# Patient Record
Sex: FEMALE | Race: WHITE | NOT HISPANIC OR LATINO | URBAN - METROPOLITAN AREA
[De-identification: names, ages, dates, MRNs, and addresses within clinical notes are randomized per-mention and may not be internally consistent; named-entity substitution may affect disease eponyms.]

---

## 2017-09-19 ENCOUNTER — EMERGENCY (EMERGENCY)
Facility: HOSPITAL | Age: 42
LOS: 1 days | Discharge: HOME | End: 2017-09-19
Attending: EMERGENCY MEDICINE | Admitting: EMERGENCY MEDICINE
Payer: COMMERCIAL

## 2017-09-19 VITALS
DIASTOLIC BLOOD PRESSURE: 77 MMHG | WEIGHT: 188.05 LBS | RESPIRATION RATE: 16 BRPM | HEART RATE: 83 BPM | SYSTOLIC BLOOD PRESSURE: 118 MMHG | OXYGEN SATURATION: 100 % | TEMPERATURE: 99 F

## 2017-09-19 VITALS
HEART RATE: 84 BPM | SYSTOLIC BLOOD PRESSURE: 124 MMHG | OXYGEN SATURATION: 98 % | RESPIRATION RATE: 15 BRPM | DIASTOLIC BLOOD PRESSURE: 74 MMHG | TEMPERATURE: 98 F

## 2017-09-19 PROCEDURE — 99283 EMERGENCY DEPT VISIT LOW MDM: CPT | Mod: 25

## 2017-09-19 PROCEDURE — 72040 X-RAY EXAM NECK SPINE 2-3 VW: CPT | Mod: 26

## 2017-09-19 PROCEDURE — 99284 EMERGENCY DEPT VISIT MOD MDM: CPT | Mod: 25

## 2017-09-19 PROCEDURE — 99053 MED SERV 10PM-8AM 24 HR FAC: CPT

## 2017-09-19 PROCEDURE — 72040 X-RAY EXAM NECK SPINE 2-3 VW: CPT

## 2017-09-19 NOTE — ED PROVIDER NOTE - OBJECTIVE STATEMENT
43yo female bib ems with neck pain. pt was at work and a 10lb parcel fell onto her head, felt her neck snap back and hyperexend, no tingling or weakness, pt denies LOC, no dizziness no other comalints
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
## 2017-09-23 DIAGNOSIS — S13.4XXA SPRAIN OF LIGAMENTS OF CERVICAL SPINE, INITIAL ENCOUNTER: ICD-10-CM
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
## 2017-09-23 DIAGNOSIS — M54.2 CERVICALGIA: ICD-10-CM

## 2019-07-16 ENCOUNTER — TRANSCRIPTION ENCOUNTER (OUTPATIENT)
Age: 44
End: 2019-07-16

## 2019-07-16 ENCOUNTER — APPOINTMENT (OUTPATIENT)
Dept: FAMILY MEDICINE | Facility: CLINIC | Age: 44
End: 2019-07-16
Payer: COMMERCIAL

## 2019-07-16 VITALS
TEMPERATURE: 98.7 F | RESPIRATION RATE: 12 BRPM | HEIGHT: 70.5 IN | BODY MASS INDEX: 25.48 KG/M2 | SYSTOLIC BLOOD PRESSURE: 110 MMHG | DIASTOLIC BLOOD PRESSURE: 70 MMHG | WEIGHT: 180 LBS | OXYGEN SATURATION: 99 % | HEART RATE: 88 BPM

## 2019-07-16 DIAGNOSIS — R05 COUGH: ICD-10-CM

## 2019-07-16 DIAGNOSIS — Z87.09 PERSONAL HISTORY OF OTHER DISEASES OF THE RESPIRATORY SYSTEM: ICD-10-CM

## 2019-07-16 PROCEDURE — 99213 OFFICE O/P EST LOW 20 MIN: CPT

## 2019-07-16 NOTE — HISTORY OF PRESENT ILLNESS
[FreeTextEntry1] : she is here for general check up, and she is complaining about cough with yellowish mucus. no fever, no vomiting.  [de-identified] : Complaints of a cough that started on July 5, 2019, no wheezing no shortness of breath

## 2019-08-29 ENCOUNTER — APPOINTMENT (OUTPATIENT)
Dept: SURGERY | Facility: CLINIC | Age: 44
End: 2019-08-29
Payer: COMMERCIAL

## 2019-08-29 ENCOUNTER — APPOINTMENT (OUTPATIENT)
Dept: FAMILY MEDICINE | Facility: CLINIC | Age: 44
End: 2019-08-29

## 2019-08-29 VITALS
HEIGHT: 70.5 IN | HEART RATE: 86 BPM | SYSTOLIC BLOOD PRESSURE: 107 MMHG | WEIGHT: 188 LBS | BODY MASS INDEX: 26.62 KG/M2 | DIASTOLIC BLOOD PRESSURE: 75 MMHG

## 2019-08-29 DIAGNOSIS — Z78.9 OTHER SPECIFIED HEALTH STATUS: ICD-10-CM

## 2019-08-29 DIAGNOSIS — Z80.42 FAMILY HISTORY OF MALIGNANT NEOPLASM OF PROSTATE: ICD-10-CM

## 2019-08-29 DIAGNOSIS — Z87.19 PERSONAL HISTORY OF OTHER DISEASES OF THE DIGESTIVE SYSTEM: ICD-10-CM

## 2019-08-29 PROCEDURE — 99243 OFF/OP CNSLTJ NEW/EST LOW 30: CPT

## 2019-08-30 ENCOUNTER — APPOINTMENT (OUTPATIENT)
Dept: FAMILY MEDICINE | Facility: CLINIC | Age: 44
End: 2019-08-30

## 2019-08-31 NOTE — HISTORY OF PRESENT ILLNESS
[de-identified] : Pt c/o left parotid mass for 1 year.  denies fever,  pain or infection, dysphagia, hoarseness, skin cancer excision, sour taste or RT exposure. \par MRI:  Left 1.7 cm Parotid mass

## 2019-08-31 NOTE — ASSESSMENT
[FreeTextEntry1] : suspect GERD as cause of arytenoid erythema. instructed to avoid caffeine and other irritants and to be evaluated by ENT. sonogram guided fine needle aspiration cytology parotid mass requested. to call next week for results.

## 2019-08-31 NOTE — PHYSICAL EXAM
[de-identified] : no palpable thyroid nodules [Laryngoscopy Performed] : laryngoscopy was performed, see procedure section for findings [Midline] : located in midline position [Normal] : orientation to person, place, and time: normal [de-identified] : mass difficult to palpate [de-identified] : indirect  laryngoscopy shows normal vocal cord mobility bilaterally with no lesions noted, arytenoid erythema noted

## 2019-08-31 NOTE — CONSULT LETTER
[Dear  ___] : Dear  [unfilled], [Consult Letter:] : I had the pleasure of evaluating your patient, [unfilled]. [Please see my note below.] : Please see my note below. [Consult Closing:] : Thank you very much for allowing me to participate in the care of this patient.  If you have any questions, please do not hesitate to contact me. [Sincerely,] : Sincerely, [FreeTextEntry2] : Dr. Andie Alvarez, Dr. Abebe Mejia [FreeTextEntry3] : Kalyan Lynn MD, FACS\par System Director, Endocrine Surgery\par [facility]\par  [DrReena  ___] : Dr. OAKES

## 2019-09-09 ENCOUNTER — FORM ENCOUNTER (OUTPATIENT)
Age: 44
End: 2019-09-09

## 2019-09-09 ENCOUNTER — RESULT REVIEW (OUTPATIENT)
Age: 44
End: 2019-09-09

## 2019-09-10 ENCOUNTER — OUTPATIENT (OUTPATIENT)
Dept: OUTPATIENT SERVICES | Facility: HOSPITAL | Age: 44
LOS: 1 days | End: 2019-09-10
Payer: COMMERCIAL

## 2019-09-10 ENCOUNTER — OTHER (OUTPATIENT)
Age: 44
End: 2019-09-10

## 2019-09-10 ENCOUNTER — APPOINTMENT (OUTPATIENT)
Dept: ULTRASOUND IMAGING | Facility: IMAGING CENTER | Age: 44
End: 2019-09-10
Payer: COMMERCIAL

## 2019-09-10 DIAGNOSIS — K11.8 OTHER DISEASES OF SALIVARY GLANDS: ICD-10-CM

## 2019-09-10 PROCEDURE — 88305 TISSUE EXAM BY PATHOLOGIST: CPT | Mod: 26

## 2019-09-10 PROCEDURE — 88173 CYTOPATH EVAL FNA REPORT: CPT

## 2019-09-10 PROCEDURE — 88172 CYTP DX EVAL FNA 1ST EA SITE: CPT

## 2019-09-10 PROCEDURE — 10005 FNA BX W/US GDN 1ST LES: CPT

## 2019-09-10 PROCEDURE — 88305 TISSUE EXAM BY PATHOLOGIST: CPT

## 2019-09-10 PROCEDURE — 88173 CYTOPATH EVAL FNA REPORT: CPT | Mod: 26

## 2019-09-12 ENCOUNTER — TRANSCRIPTION ENCOUNTER (OUTPATIENT)
Age: 44
End: 2019-09-12

## 2019-09-12 ENCOUNTER — OTHER (OUTPATIENT)
Age: 44
End: 2019-09-12

## 2019-11-19 ENCOUNTER — OUTPATIENT (OUTPATIENT)
Dept: OUTPATIENT SERVICES | Facility: HOSPITAL | Age: 44
LOS: 1 days | End: 2019-11-19

## 2019-11-19 VITALS
DIASTOLIC BLOOD PRESSURE: 74 MMHG | WEIGHT: 194.01 LBS | HEART RATE: 77 BPM | OXYGEN SATURATION: 98 % | RESPIRATION RATE: 16 BRPM | TEMPERATURE: 99 F | SYSTOLIC BLOOD PRESSURE: 112 MMHG | HEIGHT: 69 IN

## 2019-11-19 DIAGNOSIS — Z98.890 OTHER SPECIFIED POSTPROCEDURAL STATES: Chronic | ICD-10-CM

## 2019-11-19 DIAGNOSIS — K11.9 DISEASE OF SALIVARY GLAND, UNSPECIFIED: ICD-10-CM

## 2019-11-19 LAB
ANION GAP SERPL CALC-SCNC: 13 MMO/L — SIGNIFICANT CHANGE UP (ref 7–14)
BUN SERPL-MCNC: 10 MG/DL — SIGNIFICANT CHANGE UP (ref 7–23)
CALCIUM SERPL-MCNC: 9.1 MG/DL — SIGNIFICANT CHANGE UP (ref 8.4–10.5)
CHLORIDE SERPL-SCNC: 102 MMOL/L — SIGNIFICANT CHANGE UP (ref 98–107)
CO2 SERPL-SCNC: 22 MMOL/L — SIGNIFICANT CHANGE UP (ref 22–31)
CREAT SERPL-MCNC: 0.78 MG/DL — SIGNIFICANT CHANGE UP (ref 0.5–1.3)
GLUCOSE SERPL-MCNC: 98 MG/DL — SIGNIFICANT CHANGE UP (ref 70–99)
HCG SERPL-ACNC: < 5 MIU/ML — SIGNIFICANT CHANGE UP
HCT VFR BLD CALC: 43.2 % — SIGNIFICANT CHANGE UP (ref 34.5–45)
HGB BLD-MCNC: 13.8 G/DL — SIGNIFICANT CHANGE UP (ref 11.5–15.5)
MCHC RBC-ENTMCNC: 28.9 PG — SIGNIFICANT CHANGE UP (ref 27–34)
MCHC RBC-ENTMCNC: 31.9 % — LOW (ref 32–36)
MCV RBC AUTO: 90.4 FL — SIGNIFICANT CHANGE UP (ref 80–100)
NRBC # FLD: 0 K/UL — SIGNIFICANT CHANGE UP (ref 0–0)
PLATELET # BLD AUTO: 314 K/UL — SIGNIFICANT CHANGE UP (ref 150–400)
PMV BLD: 9.3 FL — SIGNIFICANT CHANGE UP (ref 7–13)
POTASSIUM SERPL-MCNC: 3.9 MMOL/L — SIGNIFICANT CHANGE UP (ref 3.5–5.3)
POTASSIUM SERPL-SCNC: 3.9 MMOL/L — SIGNIFICANT CHANGE UP (ref 3.5–5.3)
RBC # BLD: 4.78 M/UL — SIGNIFICANT CHANGE UP (ref 3.8–5.2)
RBC # FLD: 12.8 % — SIGNIFICANT CHANGE UP (ref 10.3–14.5)
SODIUM SERPL-SCNC: 137 MMOL/L — SIGNIFICANT CHANGE UP (ref 135–145)
WBC # BLD: 5.66 K/UL — SIGNIFICANT CHANGE UP (ref 3.8–10.5)
WBC # FLD AUTO: 5.66 K/UL — SIGNIFICANT CHANGE UP (ref 3.8–10.5)

## 2019-11-19 RX ORDER — SODIUM CHLORIDE 9 MG/ML
1000 INJECTION, SOLUTION INTRAVENOUS
Refills: 0 | Status: DISCONTINUED | OUTPATIENT
Start: 2019-12-02 | End: 2019-12-03

## 2019-11-19 RX ORDER — SODIUM CHLORIDE 9 MG/ML
3 INJECTION INTRAMUSCULAR; INTRAVENOUS; SUBCUTANEOUS ONCE
Refills: 0 | Status: DISCONTINUED | OUTPATIENT
Start: 2019-12-02 | End: 2019-12-03

## 2019-11-19 NOTE — H&P PST ADULT - ASSESSMENT
normal...
Problem: disease of salivary gland   Assessment and Plan: Patient scheduled for left parotidectomy with facial nerve dissection on 12/02/2019.    Patient provided with verbal and written presurgical instructions; verbalized understanding  with teach back.    Patient provided with famotidine for GI prophylaxis; verbalized understanding.    Patient provided with Chlorhexidine wash, verbal and written instructions reviewed. Patient demonstrated understanding with teach back.     Urine specimen cup provided

## 2019-11-19 NOTE — H&P PST ADULT - NSICDXPASTSURGICALHX_GEN_ALL_CORE_FT
PAST SURGICAL HISTORY:  History of colon surgery J-pouch 1996, pull through 1996    Status post laser ablation of incompetent vein

## 2019-11-19 NOTE — H&P PST ADULT - RS GEN PE MLT RESP DETAILS PC
breath sounds equal/good air movement/respirations non-labored/no rhonchi/no wheezes/clear to auscultation bilaterally/no rales/airway patent

## 2019-11-19 NOTE — H&P PST ADULT - NEGATIVE ENMT SYMPTOMS
no tinnitus/no hearing difficulty/no throat pain/no vertigo/no sinus symptoms/no nasal congestion/no nasal obstruction/no post-nasal discharge/no dry mouth/no nasal discharge/no ear pain
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
## 2019-11-27 PROBLEM — K51.90 ULCERATIVE COLITIS, UNSPECIFIED, WITHOUT COMPLICATIONS: Chronic | Status: ACTIVE | Noted: 2019-11-19

## 2019-11-27 PROBLEM — K52.9 NONINFECTIVE GASTROENTERITIS AND COLITIS, UNSPECIFIED: Chronic | Status: ACTIVE | Noted: 2019-11-19

## 2019-11-27 PROBLEM — K11.9 DISEASE OF SALIVARY GLAND, UNSPECIFIED: Chronic | Status: ACTIVE | Noted: 2019-11-19

## 2019-12-01 ENCOUNTER — TRANSCRIPTION ENCOUNTER (OUTPATIENT)
Age: 44
End: 2019-12-01

## 2019-12-02 ENCOUNTER — RESULT REVIEW (OUTPATIENT)
Age: 44
End: 2019-12-02

## 2019-12-02 ENCOUNTER — APPOINTMENT (OUTPATIENT)
Dept: SURGERY | Facility: HOSPITAL | Age: 44
End: 2019-12-02

## 2019-12-02 ENCOUNTER — OUTPATIENT (OUTPATIENT)
Dept: OUTPATIENT SERVICES | Facility: HOSPITAL | Age: 44
LOS: 1 days | Discharge: HOME | End: 2019-12-02
Payer: COMMERCIAL

## 2019-12-02 ENCOUNTER — OTHER (OUTPATIENT)
Age: 44
End: 2019-12-02

## 2019-12-02 VITALS
DIASTOLIC BLOOD PRESSURE: 61 MMHG | OXYGEN SATURATION: 98 % | SYSTOLIC BLOOD PRESSURE: 104 MMHG | HEART RATE: 74 BPM | RESPIRATION RATE: 15 BRPM

## 2019-12-02 VITALS
TEMPERATURE: 98 F | HEART RATE: 82 BPM | OXYGEN SATURATION: 96 % | SYSTOLIC BLOOD PRESSURE: 121 MMHG | DIASTOLIC BLOOD PRESSURE: 77 MMHG | WEIGHT: 194.01 LBS | RESPIRATION RATE: 18 BRPM | HEIGHT: 69 IN

## 2019-12-02 DIAGNOSIS — K11.9 DISEASE OF SALIVARY GLAND, UNSPECIFIED: ICD-10-CM

## 2019-12-02 DIAGNOSIS — Z98.890 OTHER SPECIFIED POSTPROCEDURAL STATES: Chronic | ICD-10-CM

## 2019-12-02 LAB — HCG UR QL: NEGATIVE — SIGNIFICANT CHANGE UP

## 2019-12-02 PROCEDURE — 42420 EXCISE PAROTID GLAND/LESION: CPT

## 2019-12-02 PROCEDURE — 88307 TISSUE EXAM BY PATHOLOGIST: CPT | Mod: 26

## 2019-12-02 PROCEDURE — 64716 REVISION OF CRANIAL NERVE: CPT | Mod: AS,59

## 2019-12-02 PROCEDURE — 13132 CMPLX RPR F/C/C/M/N/AX/G/H/F: CPT | Mod: 59

## 2019-12-02 PROCEDURE — 64716 REVISION OF CRANIAL NERVE: CPT | Mod: 59

## 2019-12-02 PROCEDURE — 42420 EXCISE PAROTID GLAND/LESION: CPT | Mod: AS

## 2019-12-02 RX ORDER — OXYCODONE HYDROCHLORIDE 5 MG/1
5 TABLET ORAL ONCE
Refills: 0 | Status: DISCONTINUED | OUTPATIENT
Start: 2019-12-02 | End: 2019-12-03

## 2019-12-02 RX ORDER — BENZOCAINE AND MENTHOL 5; 1 G/100ML; G/100ML
1 LIQUID ORAL
Refills: 0 | Status: DISCONTINUED | OUTPATIENT
Start: 2019-12-02 | End: 2019-12-03

## 2019-12-02 RX ORDER — NORTRIPTYLINE HYDROCHLORIDE 10 MG/1
4 CAPSULE ORAL
Qty: 0 | Refills: 0 | DISCHARGE

## 2019-12-02 RX ORDER — ACETAMINOPHEN 500 MG
650 TABLET ORAL EVERY 6 HOURS
Refills: 0 | Status: DISCONTINUED | OUTPATIENT
Start: 2019-12-02 | End: 2019-12-03

## 2019-12-02 RX ORDER — OXYCODONE AND ACETAMINOPHEN 5; 325 MG/1; MG/1
1 TABLET ORAL EVERY 4 HOURS
Refills: 0 | Status: DISCONTINUED | OUTPATIENT
Start: 2019-12-02 | End: 2019-12-03

## 2019-12-02 RX ORDER — ONDANSETRON 8 MG/1
4 TABLET, FILM COATED ORAL ONCE
Refills: 0 | Status: DISCONTINUED | OUTPATIENT
Start: 2019-12-02 | End: 2019-12-03

## 2019-12-02 RX ORDER — ACETAMINOPHEN 500 MG
2 TABLET ORAL
Qty: 0 | Refills: 0 | DISCHARGE
Start: 2019-12-02

## 2019-12-02 RX ORDER — FENTANYL CITRATE 50 UG/ML
50 INJECTION INTRAVENOUS
Refills: 0 | Status: DISCONTINUED | OUTPATIENT
Start: 2019-12-02 | End: 2019-12-03

## 2019-12-02 RX ADMIN — FENTANYL CITRATE 50 MICROGRAM(S): 50 INJECTION INTRAVENOUS at 10:50

## 2019-12-02 NOTE — ASU PATIENT PROFILE, ADULT - MUTUALITY COMMENT, PROFILE
pt will speak  to surgen and anesthesiologist pre op pt will speak  to surgeon and anesthesiologist pre op

## 2019-12-02 NOTE — BRIEF OPERATIVE NOTE - COMMENTS
no qualified surgical resident available. I assisted Dr Lynn in dissection of parotid mass, hemostasis and closure of surgical wound

## 2019-12-02 NOTE — ASU DISCHARGE PLAN (ADULT/PEDIATRIC) - CARE PROVIDER_API CALL
Kalyan Lynn)  Plastic Surgery; Surgery  [address], [address]  [place] [address]  Phone: [phone]  Fax: [phone]  Follow Up Time:

## 2019-12-02 NOTE — ASU PATIENT PROFILE, ADULT - PSH
History of colon surgery  J-pouch 1996, pull through 1996  Status post laser ablation of incompetent vein

## 2019-12-02 NOTE — ASU DISCHARGE PLAN (ADULT/PEDIATRIC) - NURSING INSTRUCTIONS
You were given intravenous TYLENOL for pain management at ___9:15am____________. Please DO NOT take any products containing TYLENOL or ACETAMINOPHEN, such as VICODIN, PERCOCET, EXCEDRIN, and any over-the-counter cold medication for the next 6 hours (until ___3:15pm___________). DO NOT TAKE MORE THAN 3000 MG OF TYLENOL in a 24 hour period.

## 2019-12-03 ENCOUNTER — APPOINTMENT (OUTPATIENT)
Dept: SURGERY | Facility: CLINIC | Age: 44
End: 2019-12-03
Payer: COMMERCIAL

## 2019-12-03 PROCEDURE — 99024 POSTOP FOLLOW-UP VISIT: CPT

## 2019-12-03 NOTE — PHYSICAL EXAM
iva came in today for a prescription check   [Midline] : located in midline position [Normal] : cranial nerves 2-12 intact

## 2019-12-03 NOTE — ASSESSMENT
[FreeTextEntry1] : s/p parotidectomy \par drain removed\par daily care\par f/u 1 month\par call for path report

## 2019-12-06 LAB — SURGICAL PATHOLOGY STUDY: SIGNIFICANT CHANGE UP

## 2019-12-09 ENCOUNTER — OTHER (OUTPATIENT)
Age: 44
End: 2019-12-09

## 2019-12-17 ENCOUNTER — INBOUND DOCUMENT (OUTPATIENT)
Age: 44
End: 2019-12-17

## 2020-01-02 ENCOUNTER — APPOINTMENT (OUTPATIENT)
Dept: SURGERY | Facility: CLINIC | Age: 45
End: 2020-01-02
Payer: COMMERCIAL

## 2020-01-02 PROCEDURE — 99024 POSTOP FOLLOW-UP VISIT: CPT

## 2020-01-02 NOTE — PHYSICAL EXAM
[de-identified] : well healed scar [Midline] : located in midline position [Normal] : orientation to person, place, and time: normal

## 2020-01-17 ENCOUNTER — APPOINTMENT (OUTPATIENT)
Dept: FAMILY MEDICINE | Facility: CLINIC | Age: 45
End: 2020-01-17
Payer: OTHER GOVERNMENT

## 2020-01-17 VITALS
HEART RATE: 86 BPM | HEIGHT: 70.5 IN | WEIGHT: 193 LBS | SYSTOLIC BLOOD PRESSURE: 110 MMHG | OXYGEN SATURATION: 98 % | TEMPERATURE: 98.1 F | BODY MASS INDEX: 27.32 KG/M2 | DIASTOLIC BLOOD PRESSURE: 68 MMHG

## 2020-01-17 LAB
BILIRUB UR QL STRIP: NORMAL
GLUCOSE UR-MCNC: NORMAL
HCG UR QL: 0.2 EU/DL
HGB UR QL STRIP.AUTO: NORMAL
KETONES UR-MCNC: NORMAL
LEUKOCYTE ESTERASE UR QL STRIP: NORMAL
NITRITE UR QL STRIP: NORMAL
PH UR STRIP: 6
PROT UR STRIP-MCNC: NORMAL
SP GR UR STRIP: 1.03

## 2020-01-17 PROCEDURE — 99213 OFFICE O/P EST LOW 20 MIN: CPT | Mod: 25

## 2020-01-17 PROCEDURE — 36415 COLL VENOUS BLD VENIPUNCTURE: CPT

## 2020-01-17 PROCEDURE — 81003 URINALYSIS AUTO W/O SCOPE: CPT | Mod: QW

## 2020-01-17 NOTE — REVIEW OF SYSTEMS
[Fatigue] : fatigue [Headache] : headache [Dizziness] : dizziness [Negative] : Psychiatric [Suicidal] : not suicidal [Insomnia] : no insomnia [Anxiety] : no anxiety [Depression] : no depression [de-identified] : Chronic headaches. Loss of balance
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
## 2020-01-17 NOTE — PHYSICAL EXAM
[Normal Sclera/Conjunctiva] : normal sclera/conjunctiva [Normal Outer Ear/Nose] : the outer ears and nose were normal in appearance [No Focal Deficits] : no focal deficits [Normal] : affect was normal and insight and judgment were intact [de-identified] : Loss of balance

## 2020-01-17 NOTE — PLAN
[FreeTextEntry1] : Return to office in one week to review blood work and get copies for her neurologist

## 2020-01-22 ENCOUNTER — RESULT REVIEW (OUTPATIENT)
Age: 45
End: 2020-01-22

## 2020-01-22 LAB
25(OH)D3 SERPL-MCNC: 17.1 NG/ML
ALBUMIN SERPL ELPH-MCNC: 4.7 G/DL
ALP BLD-CCNC: 59 U/L
ALT SERPL-CCNC: 13 U/L
ANION GAP SERPL CALC-SCNC: 10 MMOL/L
AST SERPL-CCNC: 13 U/L
BASOPHILS # BLD AUTO: 0.04 K/UL
BASOPHILS NFR BLD AUTO: 0.8 %
BILIRUB SERPL-MCNC: 0.2 MG/DL
BUN SERPL-MCNC: 14 MG/DL
CALCIUM SERPL-MCNC: 9.6 MG/DL
CHLORIDE SERPL-SCNC: 102 MMOL/L
CHOLEST SERPL-MCNC: 162 MG/DL
CHOLEST/HDLC SERPL: 2.4 RATIO
CO2 SERPL-SCNC: 29 MMOL/L
CREAT SERPL-MCNC: 0.9 MG/DL
EOSINOPHIL # BLD AUTO: 0.08 K/UL
EOSINOPHIL NFR BLD AUTO: 1.5 %
FOLATE SERPL-MCNC: 7.4 NG/ML
GLUCOSE SERPL-MCNC: 69 MG/DL
HCT VFR BLD CALC: 42.5 %
HDLC SERPL-MCNC: 68 MG/DL
HGB BLD-MCNC: 13.7 G/DL
IMM GRANULOCYTES NFR BLD AUTO: 3.8 %
IRON SATN MFR SERPL: 16 %
IRON SERPL-MCNC: 57 UG/DL
LDLC SERPL CALC-MCNC: 78 MG/DL
LYMPHOCYTES # BLD AUTO: 1.42 K/UL
LYMPHOCYTES NFR BLD AUTO: 26.8 %
MAN DIFF?: NORMAL
MCHC RBC-ENTMCNC: 29.6 PG
MCHC RBC-ENTMCNC: 32.2 GM/DL
MCV RBC AUTO: 91.8 FL
MONOCYTES # BLD AUTO: 0.71 K/UL
MONOCYTES NFR BLD AUTO: 13.4 %
NEUTROPHILS # BLD AUTO: 2.84 K/UL
NEUTROPHILS NFR BLD AUTO: 53.7 %
PLATELET # BLD AUTO: 295 K/UL
POTASSIUM SERPL-SCNC: 4.6 MMOL/L
PROT SERPL-MCNC: 6.9 G/DL
RBC # BLD: 4.63 M/UL
RBC # FLD: 13 %
SODIUM SERPL-SCNC: 141 MMOL/L
T4 FREE SERPL-MCNC: 1.2 NG/DL
TIBC SERPL-MCNC: 349 UG/DL
TRIGL SERPL-MCNC: 78 MG/DL
TSH SERPL-ACNC: 1.18 UIU/ML
UIBC SERPL-MCNC: 292 UG/DL
VIT B12 SERPL-MCNC: 435 PG/ML
WBC # FLD AUTO: 5.29 K/UL

## 2020-01-24 ENCOUNTER — APPOINTMENT (OUTPATIENT)
Dept: FAMILY MEDICINE | Facility: CLINIC | Age: 45
End: 2020-01-24
Payer: OTHER GOVERNMENT

## 2020-01-24 VITALS
HEIGHT: 70 IN | SYSTOLIC BLOOD PRESSURE: 112 MMHG | WEIGHT: 193 LBS | DIASTOLIC BLOOD PRESSURE: 68 MMHG | BODY MASS INDEX: 27.63 KG/M2 | HEART RATE: 81 BPM | OXYGEN SATURATION: 98 %

## 2020-01-24 DIAGNOSIS — F07.81 POSTCONCUSSIONAL SYNDROME: ICD-10-CM

## 2020-01-24 DIAGNOSIS — E53.8 DEFICIENCY OF OTHER SPECIFIED B GROUP VITAMINS: ICD-10-CM

## 2020-01-24 DIAGNOSIS — E55.9 VITAMIN D DEFICIENCY, UNSPECIFIED: ICD-10-CM

## 2020-01-24 PROCEDURE — 99213 OFFICE O/P EST LOW 20 MIN: CPT

## 2020-01-24 NOTE — PHYSICAL EXAM
[Normal Outer Ear/Nose] : the outer ears and nose were normal in appearance [Normal Sclera/Conjunctiva] : normal sclera/conjunctiva [No Focal Deficits] : no focal deficits [Normal] : affect was normal and insight and judgment were intact

## 2020-01-24 NOTE — PLAN
[FreeTextEntry1] : Complete weekly vitamin D x8 weeks. Start sublingual B12 as directed, and a multivitamin.\par Followup in neurology as scheduled, bring copy of blood work

## 2020-01-24 NOTE — HISTORY OF PRESENT ILLNESS
[de-identified] : A 44-year-old female complains of episodic sharp sudden epigastric pain that lasted 30 seconds.  2 other episodes within the same week, but lasted under 20 seconds first episode was January 17, the next episodes followed on the week of January 19, 2020.  History of moving light weight boxes in her garage on January 18, 2020.  No burning, no acid reflux. [FreeTextEntry1] : Pt is here to f/u from last visit on 01-.

## 2020-07-06 NOTE — H&P PST ADULT - NSCAFFEINETYPE_GEN_ALL_CORE_SD
Patient says she has heard that getting the MMR vaccine might help prevent covid - wanted to know if this was true or not? Regardless, should she get this vaccine? If so her  has appt tomorrow she can come in as well. Please c/b on cell #. pop/soda/tea

## 2020-07-14 ENCOUNTER — APPOINTMENT (OUTPATIENT)
Dept: SURGERY | Facility: CLINIC | Age: 45
End: 2020-07-14
Payer: COMMERCIAL

## 2020-07-14 PROCEDURE — 99213 OFFICE O/P EST LOW 20 MIN: CPT

## 2020-07-14 NOTE — PHYSICAL EXAM
[de-identified] : well healed scar [Midline] : located in midline position [Normal] : cranial nerves 2-12 intact

## 2020-12-21 PROBLEM — Z87.09 HISTORY OF UPPER RESPIRATORY INFECTION: Status: RESOLVED | Noted: 2019-07-16 | Resolved: 2020-12-21

## 2021-08-09 ENCOUNTER — APPOINTMENT (OUTPATIENT)
Dept: FAMILY MEDICINE | Facility: CLINIC | Age: 46
End: 2021-08-09
Payer: COMMERCIAL

## 2021-08-09 VITALS
TEMPERATURE: 95.7 F | OXYGEN SATURATION: 98 % | HEART RATE: 70 BPM | RESPIRATION RATE: 14 BRPM | WEIGHT: 197.6 LBS | BODY MASS INDEX: 28.35 KG/M2

## 2021-08-09 PROCEDURE — 99213 OFFICE O/P EST LOW 20 MIN: CPT

## 2021-08-09 NOTE — ASSESSMENT
[FreeTextEntry1] : This a notification for this 45-year-old female who came into office complaining that she noticed some white she called it starts in her mouth but she was unable to dislodge and also some right shoulder pain but she states that she developed the pain if he can be contacting steaks into the ground for her 10th on physical mouth and throat is absolutely clear there were no stones of any type and most probably the stones were probably police particles which was much more common chest range of motion in her right shoulder a little discomfort hyperlipidemic docking mistakes in the graft but tach probably cause like a very mild tendinitis I told her to take some Advil 200 mg 3 times a day with food and it showed that her resolve itself she was okay with that and she will let me know if the pain persists but I feel that we do not do any further

## 2021-08-09 NOTE — HISTORY OF PRESENT ILLNESS
[Moderate] : moderate [___ Days ago] : [unfilled] days ago [Episodic] : episodic  [Cough] : cough [Sore Throat] : sore throat [Anorexia] : anorexia [Fatigue] : fatigue [Headache] : headache [In Morning] : in the morning [Worsening] : worsening [Congestion] : no congestion [Wheezing] : no wheezing [Chills] : no chills [Shortness Of Breath] : no shortness of breath [Earache] : no earache [Fever] : no fever

## 2021-09-08 ENCOUNTER — APPOINTMENT (OUTPATIENT)
Dept: FAMILY MEDICINE | Facility: CLINIC | Age: 46
End: 2021-09-08

## 2021-09-22 ENCOUNTER — APPOINTMENT (OUTPATIENT)
Dept: FAMILY MEDICINE | Facility: CLINIC | Age: 46
End: 2021-09-22

## 2021-09-22 VITALS — OXYGEN SATURATION: 98 % | HEART RATE: 78 BPM | TEMPERATURE: 97.6 F

## 2021-10-08 ENCOUNTER — RESULT REVIEW (OUTPATIENT)
Age: 46
End: 2021-10-08

## 2021-10-12 ENCOUNTER — APPOINTMENT (OUTPATIENT)
Dept: SURGERY | Facility: CLINIC | Age: 46
End: 2021-10-12
Payer: COMMERCIAL

## 2021-10-12 PROCEDURE — 99213 OFFICE O/P EST LOW 20 MIN: CPT

## 2021-10-12 NOTE — HISTORY OF PRESENT ILLNESS
[de-identified] : Pt 2 years s/p left parotidectomy for pleomorphic adenoma doing well without complaints

## 2021-10-12 NOTE — PHYSICAL EXAM
[de-identified] : well healed scar no evidence of recurrence [Midline] : located in midline position [Normal] : orientation to person, place, and time: normal

## 2021-10-13 ENCOUNTER — APPOINTMENT (OUTPATIENT)
Dept: FAMILY MEDICINE | Facility: CLINIC | Age: 46
End: 2021-10-13
Payer: COMMERCIAL

## 2021-10-13 VITALS — SYSTOLIC BLOOD PRESSURE: 102 MMHG | DIASTOLIC BLOOD PRESSURE: 62 MMHG

## 2021-10-13 VITALS
BODY MASS INDEX: 27.49 KG/M2 | HEIGHT: 70 IN | OXYGEN SATURATION: 99 % | TEMPERATURE: 98.7 F | WEIGHT: 192 LBS | HEART RATE: 63 BPM

## 2021-10-13 DIAGNOSIS — K11.8 OTHER DISEASES OF SALIVARY GLANDS: ICD-10-CM

## 2021-10-13 DIAGNOSIS — Z00.00 ENCOUNTER FOR GENERAL ADULT MEDICAL EXAMINATION W/OUT ABNORMAL FINDINGS: ICD-10-CM

## 2021-10-13 DIAGNOSIS — Z83.3 FAMILY HISTORY OF DIABETES MELLITUS: ICD-10-CM

## 2021-10-13 PROCEDURE — 99396 PREV VISIT EST AGE 40-64: CPT | Mod: 25

## 2021-10-13 PROCEDURE — 36415 COLL VENOUS BLD VENIPUNCTURE: CPT

## 2021-10-13 PROCEDURE — 93000 ELECTROCARDIOGRAM COMPLETE: CPT

## 2021-10-13 PROCEDURE — 99215 OFFICE O/P EST HI 40 MIN: CPT | Mod: 25

## 2021-10-13 RX ORDER — MONTELUKAST 10 MG/1
10 TABLET, FILM COATED ORAL
Qty: 90 | Refills: 1 | Status: DISCONTINUED | COMMUNITY
Start: 2019-07-16 | End: 2021-10-13

## 2021-10-13 RX ORDER — OMEGA-3/DHA/EPA/FISH OIL 300-1000MG
1000 CAPSULE ORAL
Refills: 0 | Status: ACTIVE | COMMUNITY
Start: 2021-10-13

## 2021-10-13 RX ORDER — FOLIC ACID 1 MG/1
1 TABLET ORAL
Refills: 0 | Status: ACTIVE | COMMUNITY
Start: 2021-10-13

## 2021-10-13 RX ORDER — TOPIRAMATE 50 MG/1
50 TABLET, FILM COATED ORAL TWICE DAILY
Refills: 0 | Status: ACTIVE | COMMUNITY
Start: 2021-10-13

## 2021-10-13 RX ORDER — CHLORHEXIDINE GLUCONATE 4 %
325 (65 FE) LIQUID (ML) TOPICAL
Refills: 0 | Status: ACTIVE | COMMUNITY
Start: 2021-10-13

## 2021-10-13 RX ORDER — AZITHROMYCIN 250 MG/1
250 TABLET, FILM COATED ORAL
Qty: 1 | Refills: 0 | Status: DISCONTINUED | COMMUNITY
Start: 2019-07-16 | End: 2021-10-13

## 2021-10-13 RX ORDER — ERGOCALCIFEROL 1.25 MG/1
1.25 MG CAPSULE ORAL
Qty: 8 | Refills: 0 | Status: DISCONTINUED | COMMUNITY
Start: 2020-01-22 | End: 2021-10-13

## 2021-10-13 RX ORDER — NORTRIPTYLINE HCL
POWDER (GRAM) MISCELLANEOUS
Refills: 0 | Status: DISCONTINUED | COMMUNITY
End: 2021-10-13

## 2021-10-13 NOTE — HEALTH RISK ASSESSMENT
[Yes] : Yes [2 - 4 times a month (2 pts)] : 2-4 times a month (2 points) [1 or 2 (0 pts)] : 1 or 2 (0 points) [Never (0 pts)] : Never (0 points) [No] : In the past 12 months have you used drugs other than those required for medical reasons? No [0] : 2) Feeling down, depressed, or hopeless: Not at all (0) [PHQ-2 Negative - No further assessment needed] : PHQ-2 Negative - No further assessment needed [Patient reported mammogram was abnormal] : Patient reported mammogram was abnormal [Patient reported PAP Smear was normal] : Patient reported PAP Smear was normal [Patient reported colonoscopy was abnormal] : Patient reported colonoscopy was abnormal [HIV Test offered] : HIV Test offered [Hepatitis C test offered] : Hepatitis C test offered [Alone] : lives alone [On disability] : on disability [] :  [] : No [Audit-CScore] : 2 [[id number]] : 0 [MammogramDate] : 10/2021 [PapSmearDate] : 09/2021 [ColonoscopyDate] : 01/2020 [ColonoscopyComments] : she has a history of multiple polyps- s/p colectomy.

## 2021-10-13 NOTE — HISTORY OF PRESENT ILLNESS
[FreeTextEntry1] : CPE [de-identified] : 46y F w/ PMHx parotid mass s/p L parotidectomy for pleomorphic adenoma, vitamin d def, chronic headaches, presenting for CPE. \par \par Headaches: sees a neurologist Dr. opal Alvarez in [place], for vestibular issues after a concussion in 2018. she gets vestibular therapy. also being treated for headaches w/ topiramate 50mg BID. she started taking it 2-3 months ago. she feels that her headaches have gotten worse w/ this new medication. she used to take nortriptyline which didn't help though. pressure like headaches. denies vision changes, facial droop, dysphagia, extremity weakness. she gets numbness and tingling in her toes and fingertips since she started taking the topiramate. she was told by her neurologist that this could happen w/ topiramate. \par \par she recently had her left first toenail removed by her podiatrist Dr. Cook, after something heavy fell on her toe. the blood coagulated so her podiatrist removed the nail last month. she is taking abx since monday- cefadroxil 500mg BID x 10 days. she has been soaking her feet in epsalt water as advised by her podiatrist.\par \par pleomorphic adenoma: L parotidectomy 2 years in 2019 by Dr. Itzel Lamb. next f/u in 1 year\par \par she sees her obgyn at [facility] in [place] who ordered a screening mammogram. pt subsequently had an ultrasound guided solid core biopsy of her R breast on 10/8: invasive poorly diff ductal carcinoma. pathology results are in her chart. further genetic testing is pending. she has not discussed these results w/ her obgyn yet. \par \par Dr. Lars Wilder is her rectal surgeon. she had a complete colectomy in 1996 bc of > 125 polyps. she has a family history of colon polyps. she had a colostomy bag at that time. as per patient, she then had re-anasatomosis of  her small intestine to her rectum. as per patient she has no large colon left. she is supposed to see her rectal surgeon once a year. reports being due. \par \par she also takes folic acid, iron, magnesium, biotin, and fish oil.

## 2021-10-13 NOTE — ASSESSMENT
[FreeTextEntry1] : Physical Exam:\par Constitutional: No acute distress, well appearing\par HEENT: Normocephalic, atraumatic\par Neck: supple\par Cardiac: S1S2, Regular rate and rhythm, No murmurs\par Pulmonary: No respiratory distress, Lungs clear to auscultation bilaterally, no wheezing, rales, or rhonchi\par Abdomen: Soft, non-tender, non-distended, no guarding, normal bowel sounds. visible scar, healed well\par Vascular: No peripheral edema\par Neurology: Coordination grossly intact, no focal deficits\par Psychiatric: Alert and oriented x3, normal mood\par \par \par A/P:\par HCM:\par - f/u bloodwork drawn in office- will call w/ results and have her rtc if any abnormalities\par - flu- refused. \par - she got tu and tu vaccine in 5/2021.\par - TDAP- refused\par - cervical CA screening: UTD\par - EKG\par \par Headaches: \par pt w/ worsening of her HAs. \par no signs of impending stroke\par pt has established neurologist\par - advised pt to make appt with neurology for follow up of her HAs. she may need medication adjustment. \par - pt requesting us to get records from her neurologist- will obtain\par \par pleomorphic adenoma: \par stable\par - advised regular follow up w/ her surgeon\par \par Breast cancer: \par pt w/ newly diagnosed ductal carcinoma\par further genetic testing is pending\par - advised her to follow up with her obgyn asap regarding these results\par - she may need to see an oncologist for further management- advised to have all reports and testing sent to our office. pt agreeable\par \par hx colon polyps:\par reports normal BMs\par - will obtain records from her colorectal surgeon.\par - advised regular follow up with Dr. Lars Wilder

## 2021-10-14 LAB
25(OH)D3 SERPL-MCNC: 32.2 NG/ML
ALBUMIN SERPL ELPH-MCNC: 4.5 G/DL
ALP BLD-CCNC: 56 U/L
ALT SERPL-CCNC: 8 U/L
ANION GAP SERPL CALC-SCNC: 10 MMOL/L
AST SERPL-CCNC: 12 U/L
BASOPHILS # BLD AUTO: 0.03 K/UL
BASOPHILS NFR BLD AUTO: 0.6 %
BILIRUB SERPL-MCNC: 0.2 MG/DL
BUN SERPL-MCNC: 9 MG/DL
CALCIUM SERPL-MCNC: 9.2 MG/DL
CHLORIDE SERPL-SCNC: 107 MMOL/L
CHOLEST SERPL-MCNC: 155 MG/DL
CO2 SERPL-SCNC: 23 MMOL/L
CREAT SERPL-MCNC: 0.93 MG/DL
EOSINOPHIL # BLD AUTO: 0.12 K/UL
EOSINOPHIL NFR BLD AUTO: 2.2 %
ESTIMATED AVERAGE GLUCOSE: 103 MG/DL
FERRITIN SERPL-MCNC: 21 NG/ML
FOLATE SERPL-MCNC: >20 NG/ML
GLUCOSE SERPL-MCNC: 96 MG/DL
HBA1C MFR BLD HPLC: 5.2 %
HCT VFR BLD CALC: 40.3 %
HCV AB SER QL: NONREACTIVE
HCV S/CO RATIO: 0.25 S/CO
HDLC SERPL-MCNC: 54 MG/DL
HGB BLD-MCNC: 13.1 G/DL
HIV1+2 AB SPEC QL IA.RAPID: NONREACTIVE
IMM GRANULOCYTES NFR BLD AUTO: 0.4 %
IRON SATN MFR SERPL: 16 %
IRON SERPL-MCNC: 48 UG/DL
LDLC SERPL CALC-MCNC: 83 MG/DL
LYMPHOCYTES # BLD AUTO: 1.34 K/UL
LYMPHOCYTES NFR BLD AUTO: 24.6 %
MAGNESIUM SERPL-MCNC: 2.1 MG/DL
MAN DIFF?: NORMAL
MCHC RBC-ENTMCNC: 29.8 PG
MCHC RBC-ENTMCNC: 32.5 GM/DL
MCV RBC AUTO: 91.8 FL
MONOCYTES # BLD AUTO: 0.57 K/UL
MONOCYTES NFR BLD AUTO: 10.5 %
NEUTROPHILS # BLD AUTO: 3.37 K/UL
NEUTROPHILS NFR BLD AUTO: 61.7 %
NONHDLC SERPL-MCNC: 101 MG/DL
PLATELET # BLD AUTO: 306 K/UL
POTASSIUM SERPL-SCNC: 4.2 MMOL/L
PROT SERPL-MCNC: 6.8 G/DL
RBC # BLD: 4.39 M/UL
RBC # FLD: 13.2 %
SODIUM SERPL-SCNC: 140 MMOL/L
T4 FREE SERPL-MCNC: 1.2 NG/DL
TIBC SERPL-MCNC: 293 UG/DL
TRANSFERRIN SERPL-MCNC: 256 MG/DL
TRIGL SERPL-MCNC: 90 MG/DL
TSH SERPL-ACNC: 1.86 UIU/ML
UIBC SERPL-MCNC: 246 UG/DL
VIT B12 SERPL-MCNC: 464 PG/ML
WBC # FLD AUTO: 5.45 K/UL

## 2021-12-08 ENCOUNTER — NON-APPOINTMENT (OUTPATIENT)
Age: 46
End: 2021-12-08

## 2022-03-30 ENCOUNTER — APPOINTMENT (OUTPATIENT)
Dept: FAMILY MEDICINE | Facility: CLINIC | Age: 47
End: 2022-03-30

## 2022-05-04 ENCOUNTER — APPOINTMENT (OUTPATIENT)
Dept: FAMILY MEDICINE | Facility: CLINIC | Age: 47
End: 2022-05-04
Payer: MEDICARE

## 2022-05-04 VITALS
BODY MASS INDEX: 26.48 KG/M2 | TEMPERATURE: 97.3 F | OXYGEN SATURATION: 98 % | SYSTOLIC BLOOD PRESSURE: 123 MMHG | HEIGHT: 70 IN | HEART RATE: 97 BPM | RESPIRATION RATE: 14 BRPM | WEIGHT: 185 LBS | DIASTOLIC BLOOD PRESSURE: 75 MMHG

## 2022-05-04 DIAGNOSIS — R51.9 HEADACHE, UNSPECIFIED: ICD-10-CM

## 2022-05-04 DIAGNOSIS — Z92.21 PERSONAL HISTORY OF ANTINEOPLASTIC CHEMOTHERAPY: ICD-10-CM

## 2022-05-04 PROCEDURE — 99213 OFFICE O/P EST LOW 20 MIN: CPT

## 2022-05-04 NOTE — HISTORY OF PRESENT ILLNESS
[de-identified] : 46y F w/ PMHx Breast CA s/p chemo, parotid mass s/p L parotidectomy for pleomorphic adenoma, chronic headaches, presenting for cardio referral. \par \par headaches: vestibular issues after a concussion in 2018. she is no longer taking topiramate. she switched insurance and her old neuro Dr. Alvarez is no longer taking her insurance. she reports that her headaches have overall improved and she does not want to establish care with new neuro at this time but rather focus on her cancer treatment. \par \par she was also found to have invasive poorly diff ductal CA on 10/2021 on a routine mammogram. she was found to have HER2+ R sided invasive ductal carcinoma. ECHO done prior to chemo in 12/1/2021 with Dr. jeanna Cardona showed normal EF and mild- mod AL. she completed neoadjuvant  chemo locally. had R mammo done 3/23/2022: no new findings, clip at site of known cancer. b/l breast MRI 3/23/2022: complete resolution of prior enhancing mass. slight increased in size of a L prominent axillary node. left axillary sono 3/30/2022: multiple slightly enlarged morphologically normal nodes, likely reactive. follow up sono recommended in 6 months (9/2022).  next tuesday 5/10/22 she will be having R lumpectomy with R sentinel node bx w/ possible axillary dissection. she was told she may be needing radiation after depending on how the surgery goes. the oncology team also requesting her to get repeat ECHO prior to radiation. her old cardiologist does not take her new insurance but she has an appt with a new cardiologist tomorrow in [place]- requesting referral. \par \par she also follows with her rectal surgeon once yearly. s/p complete colectomy in 1996 due > 125 polyps. she did not see him yet this year due to her current breast CA dx and tx.  [FreeTextEntry1] : Patient is here for referral

## 2022-05-04 NOTE — ASSESSMENT
[FreeTextEntry1] : Physical Exam:\par Constitutional: No acute distress, well appearing\par HEENT: Normocephalic, atraumatic\par Neck: supple\par Cardiac: S1S2, Regular rate and rhythm, No murmurs\par Pulmonary: No respiratory distress, Lungs clear to auscultation bilaterally, no wheezing, rales, or rhonchi\par Abdomen: Soft, non-tender, non-distended, no guarding, normal bowel sounds\par Vascular: No peripheral edema\par Neurology: Coordination grossly intact, no focal deficits\par Psychiatric: Alert and oriented x3, normal mood\par \par \par \par A/P:\par headaches: \par stable\par - offered neuro referral but she would like to hold off for now due to her current active cancer tx\par \par Breast CA:\par active\par s/p chemo\par scheduled for surgery on tuesday\par - regular follow up with rad onc and heme/ onc team\par - will give referral for cardiology today as requested. pt needs echo post chemo. \par \par hx polyps:\par s/p colectomy\par - she does not want to see her surgeon right now. i recommended she f/u with her surgeon when things are more stable with her active breast CA dx.

## 2022-05-04 NOTE — HISTORY OF PRESENT ILLNESS
[de-identified] : 46y F w/ PMHx Breast CA s/p chemo, parotid mass s/p L parotidectomy for pleomorphic adenoma, chronic headaches, presenting for cardio referral. \par \par headaches: vestibular issues after a concussion in 2018. she is no longer taking topiramate. she switched insurance and her old neuro Dr. Alvarez is no longer taking her insurance. she reports that her headaches have overall improved and she does not want to establish care with new neuro at this time but rather focus on her cancer treatment. \par \par she was also found to have invasive poorly diff ductal CA on 10/2021 on a routine mammogram. she was found to have HER2+ R sided invasive ductal carcinoma. ECHO done prior to chemo in 12/1/2021 with Dr. jeanna Cardona showed normal EF and mild- mod NC. she completed neoadjuvant  chemo locally. had R mammo done 3/23/2022: no new findings, clip at site of known cancer. b/l breast MRI 3/23/2022: complete resolution of prior enhancing mass. slight increased in size of a L prominent axillary node. left axillary sono 3/30/2022: multiple slightly enlarged morphologically normal nodes, likely reactive. follow up sono recommended in 6 months (9/2022).  next tuesday 5/10/22 she will be having R lumpectomy with R sentinel node bx w/ possible axillary dissection. she was told she may be needing radiation after depending on how the surgery goes. the oncology team also requesting her to get repeat ECHO prior to radiation. her old cardiologist does not take her new insurance but she has an appt with a new cardiologist tomorrow in [place]- requesting referral. \par \par she also follows with her rectal surgeon once yearly. s/p complete colectomy in 1996 due > 125 polyps. she did not see him yet this year due to her current breast CA dx and tx.  [FreeTextEntry1] : Patient is here for referral

## 2022-05-18 ENCOUNTER — NON-APPOINTMENT (OUTPATIENT)
Age: 47
End: 2022-05-18

## 2022-05-18 ENCOUNTER — APPOINTMENT (OUTPATIENT)
Dept: RADIATION ONCOLOGY | Facility: CLINIC | Age: 47
End: 2022-05-18
Payer: MEDICARE

## 2022-05-18 VITALS
RESPIRATION RATE: 17 BRPM | DIASTOLIC BLOOD PRESSURE: 64 MMHG | WEIGHT: 187 LBS | OXYGEN SATURATION: 98 % | BODY MASS INDEX: 26.77 KG/M2 | SYSTOLIC BLOOD PRESSURE: 102 MMHG | HEIGHT: 70 IN | HEART RATE: 72 BPM

## 2022-05-18 DIAGNOSIS — Z80.8 FAMILY HISTORY OF MALIGNANT NEOPLASM OF OTHER ORGANS OR SYSTEMS: ICD-10-CM

## 2022-05-18 PROCEDURE — 99204 OFFICE O/P NEW MOD 45 MIN: CPT | Mod: 25

## 2022-05-18 PROCEDURE — 77263 THER RADIOLOGY TX PLNG CPLX: CPT

## 2022-05-18 RX ORDER — CEFADROXIL 500 MG/1
500 CAPSULE ORAL TWICE DAILY
Refills: 0 | Status: DISCONTINUED | COMMUNITY
Start: 2021-10-13 | End: 2022-05-18

## 2022-05-18 NOTE — VITALS
[Maximal Pain Intensity: 2/10] : 2/10 [90: Able to carry normal activity; minor signs or symptoms of disease.] : 90: Able to carry normal activity; minor signs or symptoms of disease.

## 2022-05-19 NOTE — HISTORY OF PRESENT ILLNESS
[FreeTextEntry1] : This is a 46 year old female diagnosed with right breast cancer in October 2021 referred by Dr. Simon. \par \par She has a history of pleomorphic adenoma s/p left parotidectomy by Dr. Lynn in 2019. She also has a history of a colectomy in 1996 (due to over 125 polyps), as well as a re-anastomosis of her small intestine to her rectum.  She is due to see her rectal surgeon, Dr. Lars Wilder. \par \par Routine bilateral mammogram/sono performed on 9/14/21 showed a 1.2 x 0.4 x 0.8 cm hypoechoic nodule with indistinct margins at the 9:30 axis, 8 cm FN. \par \par Right breast 9:30, 8 cm FN ultrasound guided core biopsy performed on 10/8/21 showed invasive poorly differentiated ductal carcinoma, measuring 0.6 cm. Hailey score 8/9. DCIS, solid pattern with intermediate to high grade nuclear atypia. Microcalcifications present. LVI not seen. ER 60% positive, IA 10% positive, HER2 positive by FISH. \par \par Bilateral breast MRI performed on 10/20/21 showed post biopsy changes and or residual disease measuring up to 2 cm seen around the biopsy clip. There is an indeterminate 5 mm enhancing nodule seen in the 3:00 axis also in the right breast. Linear non mass enhancement is seen in the retroareolar region. \par \par Additional MRI biopsies performed on 10/27/21. Right breast posterior 3:00 was negative. Right breast retroareolar was negative. \par \par She completed 6 cycles of neoadjuvant TCHP with Dr. Simon, which she began on 12/8/21. \par \par Right breast lumpect[place] with SLNB [place] 5[place]10[place]22 [place] [facility] [place] showed no residual invasive carcinoma. DCIS present, measuring 34 mm. DCIS, solid type nuclear grade 3, present in 12 out of 14 tissue blocks. Margins negative. Closest margin (medial margin) > 4 mm.  0/5 right axillary lymph nodes.. \par \par She is feeling well. Denies pain. \par \par She presents to discuss the role of radiation therapy in her care.

## 2022-05-19 NOTE — OB/GYN HISTORY
[___] : Total Pregnancies: [unfilled] [Approximately ___ (Month)] : the LMP was approximately [unfilled] month(s) ago [History of Birth Control Pills] : Patient has a history of taking birth control pills [History of Hormone Replacement Therapy] : no history of hormone replacement therapy

## 2022-05-19 NOTE — PHYSICAL EXAM
[No UE Edema] : there is no upper extremity edema [Supraclavicular Lymph Nodes Enlarged Bilaterally] : supraclavicular [Axillary Lymph Nodes Enlarged Bilaterally] : axillary [Normal] : oriented to person, place and time, the affect was normal, the mood was normal and not anxious [de-identified] : Right breast with healing surgical incision at lateral aspect.  separate right axillary incision.  Mild right breast edema.  chemotherapy port present in right anterior superior chest wall.

## 2022-05-19 NOTE — DISEASE MANAGEMENT
[Clinical] : TNM Stage: c [I] : I [FreeTextEntry4] : inv ductal ca right breast, ER/WY +, Her2 + [TTNM] : 1c [NTNM] : 0 [MTNM] : 0

## 2022-05-23 ENCOUNTER — FORM ENCOUNTER (OUTPATIENT)
Age: 47
End: 2022-05-23

## 2022-05-25 ENCOUNTER — APPOINTMENT (OUTPATIENT)
Dept: RADIATION ONCOLOGY | Facility: CLINIC | Age: 47
End: 2022-05-25

## 2022-07-26 ENCOUNTER — APPOINTMENT (OUTPATIENT)
Dept: FAMILY MEDICINE | Facility: CLINIC | Age: 47
End: 2022-07-26
Payer: MEDICARE

## 2022-07-26 VITALS
RESPIRATION RATE: 14 BRPM | BODY MASS INDEX: 25.34 KG/M2 | SYSTOLIC BLOOD PRESSURE: 116 MMHG | HEART RATE: 71 BPM | TEMPERATURE: 97.8 F | OXYGEN SATURATION: 97 % | WEIGHT: 177 LBS | DIASTOLIC BLOOD PRESSURE: 80 MMHG | HEIGHT: 70 IN

## 2022-07-26 DIAGNOSIS — U07.1 COVID-19: ICD-10-CM

## 2022-07-26 DIAGNOSIS — C50.919 MALIGNANT NEOPLASM OF UNSPECIFIED SITE OF UNSPECIFIED FEMALE BREAST: ICD-10-CM

## 2022-07-26 DIAGNOSIS — Z86.010 PERSONAL HISTORY OF COLONIC POLYPS: ICD-10-CM

## 2022-07-26 PROCEDURE — 99214 OFFICE O/P EST MOD 30 MIN: CPT

## 2022-07-26 RX ORDER — FLUTICASONE PROPIONATE 50 UG/1
50 SPRAY, METERED NASAL DAILY
Qty: 1 | Refills: 0 | Status: ACTIVE | COMMUNITY
Start: 2022-07-26 | End: 1900-01-01

## 2022-07-26 NOTE — HISTORY OF PRESENT ILLNESS
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 \par \emily had sx on 7/8 and tested positive on 7/11. she was lethargic. she was at a convention and thinks she had it there. she had fatigue and no appetite. still having a cough. she has slight less appetite and lost 7 lb.  has slight sore throat.  she is getting her taste back. 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 s/p R partial mastectomy w/ sentinel LN biopsy on 5/10 by Dr. Tran at [facility] [place]. pathology showed no residual invasive CA. DCIS present. had radiation after w/ Dr. Segura- she finished tx on july 1st. taking tamoxifene since yesterday. she is getting adjunctive tx every 3 weeks. had echo repeated after chemo- no changes. \par \par s/p complete colectomy 1996 due to > 125 polyps and reanastomosis of her small intestine to her rectum. due to see her rectal surgeon Dr. Wilder. supposed to see once yearly.\par \par she needs a note. 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

## 2022-07-26 NOTE — ASSESSMENT
[FreeTextEntry1] : Physical Exam:\par Constitutional: No acute distress, well appearing\par HEENT: Normocephalic, atraumatic\par Neck: supple\par Cardiac: S1S2, Regular rate and rhythm, No murmurs\par Pulmonary: No respiratory distress, Lungs clear to auscultation bilaterally, no wheezing, rales, or rhonchi\par Abdomen: Soft, non-tender, non-distended, no guarding, normal bowel sounds\par Vascular: No peripheral edema\par Neurology: Coordination grossly intact, no focal deficits\par Psychiatric: Alert and oriented x3, normal mood\par \par \par \par a/P:\par COVID-19\par improving\par having post nasal drip\par - will send rx for flonase prn\par \par breast CA\par completed surgery and radiation\par getting adjunctive tx\par - regular follow up with heme onc and surgeon\par \par hx polyps\par - rec she f/u with her rectal surgeon

## 2024-10-17 ENCOUNTER — NON-APPOINTMENT (OUTPATIENT)
Age: 49
End: 2024-10-17

## 2024-10-17 ENCOUNTER — APPOINTMENT (OUTPATIENT)
Dept: OTOLARYNGOLOGY | Facility: CLINIC | Age: 49
End: 2024-10-17
Payer: MEDICARE

## 2024-10-17 VITALS
BODY MASS INDEX: 28.92 KG/M2 | HEART RATE: 73 BPM | HEIGHT: 70 IN | DIASTOLIC BLOOD PRESSURE: 62 MMHG | SYSTOLIC BLOOD PRESSURE: 95 MMHG | WEIGHT: 202 LBS

## 2024-10-17 DIAGNOSIS — J02.9 ACUTE PHARYNGITIS, UNSPECIFIED: ICD-10-CM

## 2024-10-17 PROCEDURE — 99203 OFFICE O/P NEW LOW 30 MIN: CPT

## 2025-05-13 ENCOUNTER — APPOINTMENT (OUTPATIENT)
Facility: CLINIC | Age: 50
End: 2025-05-13
Payer: MEDICARE

## 2025-05-13 VITALS — HEART RATE: 77 BPM | SYSTOLIC BLOOD PRESSURE: 105 MMHG | DIASTOLIC BLOOD PRESSURE: 70 MMHG

## 2025-05-13 DIAGNOSIS — I72.9 ANEURYSM OF UNSPECIFIED SITE: ICD-10-CM

## 2025-05-13 PROCEDURE — 99205 OFFICE O/P NEW HI 60 MIN: CPT

## 2025-05-13 RX ORDER — TAMOXIFEN CITRATE 10 MG/1
10 TABLET, FILM COATED ORAL
Refills: 0 | Status: ACTIVE | COMMUNITY